# Patient Record
Sex: FEMALE | Race: WHITE | Employment: STUDENT | URBAN - METROPOLITAN AREA
[De-identification: names, ages, dates, MRNs, and addresses within clinical notes are randomized per-mention and may not be internally consistent; named-entity substitution may affect disease eponyms.]

---
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
## 2023-07-20 SDOH — ECONOMIC STABILITY: TRANSPORTATION INSECURITY
IN THE PAST 12 MONTHS, HAS LACK OF TRANSPORTATION KEPT YOU FROM MEETINGS, WORK, OR FROM GETTING THINGS NEEDED FOR DAILY LIVING?: NO

## 2023-07-20 SDOH — ECONOMIC STABILITY: HOUSING INSECURITY
IN THE LAST 12 MONTHS, WAS THERE A TIME WHEN YOU DID NOT HAVE A STEADY PLACE TO SLEEP OR SLEPT IN A SHELTER (INCLUDING NOW)?: NO

## 2023-07-20 SDOH — ECONOMIC STABILITY: INCOME INSECURITY: HOW HARD IS IT FOR YOU TO PAY FOR THE VERY BASICS LIKE FOOD, HOUSING, MEDICAL CARE, AND HEATING?: NOT HARD AT ALL

## 2023-07-20 SDOH — ECONOMIC STABILITY: FOOD INSECURITY: WITHIN THE PAST 12 MONTHS, THE FOOD YOU BOUGHT JUST DIDN'T LAST AND YOU DIDN'T HAVE MONEY TO GET MORE.: NEVER TRUE

## 2023-07-20 SDOH — ECONOMIC STABILITY: FOOD INSECURITY: WITHIN THE PAST 12 MONTHS, YOU WORRIED THAT YOUR FOOD WOULD RUN OUT BEFORE YOU GOT MONEY TO BUY MORE.: NEVER TRUE

## 2023-07-20 ASSESSMENT — PATIENT HEALTH QUESTIONNAIRE - PHQ9
2. FEELING DOWN, DEPRESSED OR HOPELESS: 1
1. LITTLE INTEREST OR PLEASURE IN DOING THINGS: NOT AT ALL
SUM OF ALL RESPONSES TO PHQ QUESTIONS 1-9: 1
SUM OF ALL RESPONSES TO PHQ9 QUESTIONS 1 & 2: 1
SUM OF ALL RESPONSES TO PHQ QUESTIONS 1-9: 1
2. FEELING DOWN, DEPRESSED OR HOPELESS: SEVERAL DAYS
SUM OF ALL RESPONSES TO PHQ QUESTIONS 1-9: 1
SUM OF ALL RESPONSES TO PHQ QUESTIONS 1-9: 1
SUM OF ALL RESPONSES TO PHQ9 QUESTIONS 1 & 2: 1
1. LITTLE INTEREST OR PLEASURE IN DOING THINGS: 0

## 2023-07-21 ENCOUNTER — OFFICE VISIT (OUTPATIENT)
Dept: FAMILY MEDICINE CLINIC | Age: 18
End: 2023-07-21
Payer: COMMERCIAL

## 2023-07-21 VITALS
WEIGHT: 123.8 LBS | TEMPERATURE: 98.1 F | SYSTOLIC BLOOD PRESSURE: 102 MMHG | RESPIRATION RATE: 12 BRPM | DIASTOLIC BLOOD PRESSURE: 68 MMHG | HEART RATE: 72 BPM

## 2023-07-21 DIAGNOSIS — F41.9 ANXIETY: ICD-10-CM

## 2023-07-21 DIAGNOSIS — E55.9 VITAMIN D DEFICIENCY: ICD-10-CM

## 2023-07-21 DIAGNOSIS — R42 DIZZINESS: Primary | ICD-10-CM

## 2023-07-21 PROCEDURE — 99214 OFFICE O/P EST MOD 30 MIN: CPT | Performed by: FAMILY MEDICINE

## 2023-07-21 RX ORDER — FLUOXETINE HYDROCHLORIDE 20 MG/1
20 CAPSULE ORAL DAILY
Qty: 90 CAPSULE | Refills: 3 | Status: SHIPPED | OUTPATIENT
Start: 2023-07-21

## 2023-07-21 ASSESSMENT — ENCOUNTER SYMPTOMS
SHORTNESS OF BREATH: 0
GASTROINTESTINAL NEGATIVE: 1

## 2023-07-21 NOTE — PROGRESS NOTES
2023    Leyda Gutierrez (:  2005) is a 19 y.o. female, Established patient, here for evaluation of the following chief complaint(s):  Depression (Previously on Prozac. (1-2 months ago) Would like to get back on.) and Dizziness (Intermittent dizziness. Happens at random times, last episode 1 week ago, lasting 2-5 minutes. Sits down, drinks water and symptoms resolve. )      ASSESSMENT/PLAN:    1. Dizziness  -     CBC with Auto Differential; Future  -     Comprehensive Metabolic Panel; Future  -     TSH; Future  -     T4, Free; Future  2. Anxiety  -     FLUoxetine (PROZAC) 20 MG capsule; Take 1 capsule by mouth daily, Disp-90 capsule, R-3Normal  3. Vitamin D deficiency  -     Vitamin D 25 Hydroxy; Future    Labs as above due to dizziness    Restart fluoxetine at 20mg dose. She will send a [employer] message in 6 weeks with an update on her progress with fluoxetine. We may need to adjust the dose at that time if her symptoms are not well controlled    Good self care recommended with a healthy diet, increased exercise and adequate sleep    Follow up if not better      SUBJECTIVE/OBJECTIVE:    HPI    Patient here today with c/o a few episodes of dizziness and near syncope. She reports that she notices decreased hearing and then tunnel vision and then feels dizzy like she might pass out. She works as a medical scribe in an ER and it happened once while in a patient room. She denies any associated chest pain, shortness of breath or visual change. She has an occasional headache, but nothing consistent. She also c/o increased anxiety since stopping her prozac a few months ago. She was doing well and didn't feel like she needed the medication anymore. Since stopping the med, she has noticed more anxiety and panic. She would like to restart fluoxetine at this time. Review of Systems   Constitutional:  Negative for unexpected weight change. Eyes:  Negative for visual disturbance.    Respiratory:

## 2023-08-03 LAB
ABSOLUTE BASO #: 0.04 K/UL (ref 0–0.2)
ABSOLUTE EOS #: 0.1 K/UL (ref 0–0.5)
ABSOLUTE LYMPH #: 1.92 K/UL (ref 1–4)
ABSOLUTE MONO #: 0.49 K/UL (ref 0.2–1)
ABSOLUTE NEUT #: 2.78 K/UL (ref 1.5–7.5)
BASOPHILS RELATIVE PERCENT: 0.7 %
EOSINOPHILS RELATIVE PERCENT: 1.9 %
HCT VFR BLD CALC: 37.5 % (ref 34–45)
HEMOGLOBIN: 12.6 G/DL (ref 11.5–15.5)
LYMPHOCYTE %: 36 %
MCH RBC QN AUTO: 30.6 PG (ref 25–33)
MCHC RBC AUTO-ENTMCNC: 33.6 G/DL (ref 31–36)
MCV RBC AUTO: 91 FL (ref 80–99)
MONOCYTES # BLD: 9.2 %
NEUTROPHILS RELATIVE PERCENT: 52 %
PDW BLD-RTO: 12 % (ref 11.5–15)
PLATELETS: 245 K/UL (ref 130–400)
PMV BLD AUTO: 10.9 FL (ref 9.3–13)
RBC: 4.12 M/UL (ref 3.8–5.4)
WBC: 5.3 K/UL (ref 3.5–11)

## 2023-08-04 LAB
ALBUMIN SERPL-MCNC: 5.1 G/DL (ref 3.5–5.2)
ALK PHOSPHATASE: 71 U/L (ref 40–116)
ALT SERPL-CCNC: 10 U/L (ref 5–40)
ANION GAP SERPL CALCULATED.3IONS-SCNC: 13 MEQ/L (ref 7–16)
AST SERPL-CCNC: 16 U/L (ref 9–40)
BILIRUB SERPL-MCNC: 0.7 MG/DL
BUN BLDV-MCNC: 16 MG/DL (ref 6–20)
CALCIUM SERPL-MCNC: 10.5 MG/DL (ref 8.5–10.5)
CHLORIDE BLD-SCNC: 107 MEQ/L (ref 95–107)
CO2: 25 MEQ/L (ref 19–31)
CREAT SERPL-MCNC: 0.74 MG/DL (ref 0.6–1.3)
EGFR IF NONAFRICAN AMERICAN: 119 ML/MIN/1.73
GLUCOSE: 85 MG/DL (ref 70–99)
POTASSIUM SERPL-SCNC: 5.6 MEQ/L (ref 3.5–5.4)
SODIUM BLD-SCNC: 145 MEQ/L (ref 133–146)
T4 FREE: 1.34 NG/DL (ref 0.8–1.9)
TOTAL PROTEIN: 7.1 G/DL (ref 6.1–8.3)
TSH SERPL DL<=0.05 MIU/L-ACNC: 0.86 UIU/ML (ref 0.4–4.1)
VITAMIN D 25-HYDROXY: 31 NG/ML

## 2023-08-07 ENCOUNTER — TELEPHONE (OUTPATIENT)
Dept: FAMILY MEDICINE CLINIC | Age: 18
End: 2023-08-07

## 2023-08-07 NOTE — TELEPHONE ENCOUNTER
----- Message from Don Curtis MD sent at 8/4/2023 12:31 PM EDT -----  Please notify Radha Fulton that her labs look great with the exception of mildly elevated potassium. Decrease potassium in the diet (less bananas, oranges, OJ, potatoes, peaches) and stop any vitamins containing extra potassium. Call and check on her to see if she's feeling better.  CG

## 2023-08-07 NOTE — TELEPHONE ENCOUNTER
Pt informed and verbalized understanding with no further questions at this time. She stated she is still exp. Dizzy spells on/off but pt states she Is feeling better and will contact us if symptoms do not improve. Denies taking any otc vitamins containing potassium.

## 2025-04-30 ENCOUNTER — TELEMEDICINE (OUTPATIENT)
Dept: FAMILY MEDICINE CLINIC | Age: 20
End: 2025-04-30

## 2025-04-30 DIAGNOSIS — F41.0 PANIC DISORDER: ICD-10-CM

## 2025-04-30 DIAGNOSIS — F41.9 ANXIETY: Primary | ICD-10-CM

## 2025-04-30 PROCEDURE — 99214 OFFICE O/P EST MOD 30 MIN: CPT | Performed by: FAMILY MEDICINE

## 2025-04-30 RX ORDER — HYDROXYZINE HYDROCHLORIDE 25 MG/1
12.5-25 TABLET, FILM COATED ORAL EVERY 8 HOURS PRN
Qty: 30 TABLET | Refills: 1 | Status: SHIPPED | OUTPATIENT
Start: 2025-04-30

## 2025-04-30 RX ORDER — HYDROXYZINE HYDROCHLORIDE 25 MG/1
12.5-25 TABLET, FILM COATED ORAL EVERY 8 HOURS PRN
Qty: 30 TABLET | Refills: 1 | Status: SHIPPED | OUTPATIENT
Start: 2025-04-30 | End: 2025-04-30 | Stop reason: SDUPTHER

## 2025-04-30 SDOH — ECONOMIC STABILITY: INCOME INSECURITY: IN THE LAST 12 MONTHS, WAS THERE A TIME WHEN YOU WERE NOT ABLE TO PAY THE MORTGAGE OR RENT ON TIME?: NO

## 2025-04-30 SDOH — ECONOMIC STABILITY: FOOD INSECURITY: WITHIN THE PAST 12 MONTHS, YOU WORRIED THAT YOUR FOOD WOULD RUN OUT BEFORE YOU GOT MONEY TO BUY MORE.: NEVER TRUE

## 2025-04-30 SDOH — ECONOMIC STABILITY: FOOD INSECURITY: WITHIN THE PAST 12 MONTHS, THE FOOD YOU BOUGHT JUST DIDN'T LAST AND YOU DIDN'T HAVE MONEY TO GET MORE.: NEVER TRUE

## 2025-04-30 SDOH — ECONOMIC STABILITY: TRANSPORTATION INSECURITY
IN THE PAST 12 MONTHS, HAS THE LACK OF TRANSPORTATION KEPT YOU FROM MEDICAL APPOINTMENTS OR FROM GETTING MEDICATIONS?: NO

## 2025-04-30 ASSESSMENT — ENCOUNTER SYMPTOMS
NAUSEA: 1
RESPIRATORY NEGATIVE: 1
ABDOMINAL PAIN: 1

## 2025-04-30 NOTE — PROGRESS NOTES
abdominal pain, shakiness, and tearfulness.  Episodes last approximately 1 hour before resolving.  She has been trying to calm her anxiety by doing puzzles and going for walks but is still having significant symptoms.  Weight is down approximately 10 pounds, which the patient attributes to her anxiety.  She denies significant depression.  She is interested in a trial of medication to help with her symptoms.    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  The application window open soon.  She has finished college and is currently working.    Review of Systems   Constitutional:  Positive for appetite change and unexpected weight change.   HENT: Negative.     Respiratory: Negative.     Cardiovascular: Negative.    Gastrointestinal:  Positive for abdominal pain and nausea.   Musculoskeletal: Negative.    Skin: Negative.    Psychiatric/Behavioral:  Negative for dysphoric mood. The patient is nervous/anxious.    All other systems reviewed and are negative.      Meds:  none      Social History     Tobacco Use    Smoking status: Never    Smokeless tobacco: Never   Substance Use Topics    Alcohol use: Never    Drug use: No          PHYSICAL EXAMINATION:  [ INSTRUCTIONS:  \"[x]\" Indicates a positive item  \"[]\" Indicates a negative item  -- DELETE ALL ITEMS NOT EXAMINED]  Vital Signs: (As obtained by patient/caregiver or practitioner observation)     Blood pressure-          Heart rate-         Respiratory rate-         Temperature-            Pulse oximetry-      Constitutional: [x] Appears well-developed and well-nourished [x] No apparent distress                            [] Abnormal-   Mental status  [x] Alert and awake  [x] Oriented to person/place/time [x]Able to follow commands       Eyes:  EOM    [x]  Normal  [] Abnormal-  Sclera  [x]  Normal  [] Abnormal -         Discharge [x]  None visible  [] Abnormal -     HENT:   [x] Normocephalic,

## 2025-08-05 DIAGNOSIS — F41.0 PANIC DISORDER: ICD-10-CM

## 2025-08-05 DIAGNOSIS — F41.9 ANXIETY: ICD-10-CM

## 2025-08-06 RX ORDER — HYDROXYZINE HYDROCHLORIDE 25 MG/1
12.5-25 TABLET, FILM COATED ORAL EVERY 8 HOURS PRN
Qty: 30 TABLET | Refills: 1 | Status: SHIPPED | OUTPATIENT
Start: 2025-08-06